# Patient Record
Sex: MALE | Race: WHITE
[De-identification: names, ages, dates, MRNs, and addresses within clinical notes are randomized per-mention and may not be internally consistent; named-entity substitution may affect disease eponyms.]

---

## 2020-06-24 ENCOUNTER — HOSPITAL ENCOUNTER (INPATIENT)
Dept: HOSPITAL 62 - ER | Age: 65
LOS: 1 days | Discharge: HOME | DRG: 309 | End: 2020-06-25
Attending: INTERNAL MEDICINE | Admitting: INTERNAL MEDICINE
Payer: COMMERCIAL

## 2020-06-24 DIAGNOSIS — E78.00: ICD-10-CM

## 2020-06-24 DIAGNOSIS — I48.11: Primary | ICD-10-CM

## 2020-06-24 DIAGNOSIS — K44.9: ICD-10-CM

## 2020-06-24 DIAGNOSIS — E78.5: ICD-10-CM

## 2020-06-24 DIAGNOSIS — Z88.2: ICD-10-CM

## 2020-06-24 DIAGNOSIS — K80.00: ICD-10-CM

## 2020-06-24 DIAGNOSIS — I10: ICD-10-CM

## 2020-06-24 DIAGNOSIS — Z79.82: ICD-10-CM

## 2020-06-24 DIAGNOSIS — Z79.899: ICD-10-CM

## 2020-06-24 LAB
ADD MANUAL DIFF: NO
ALBUMIN SERPL-MCNC: 4.6 G/DL (ref 3.5–5)
ALP SERPL-CCNC: 69 U/L (ref 38–126)
ANION GAP SERPL CALC-SCNC: 8 MMOL/L (ref 5–19)
APPEARANCE UR: CLEAR
APTT PPP: YELLOW S
AST SERPL-CCNC: 31 U/L (ref 17–59)
BASOPHILS # BLD AUTO: 0.1 10^3/UL (ref 0–0.2)
BASOPHILS NFR BLD AUTO: 0.6 % (ref 0–2)
BILIRUB DIRECT SERPL-MCNC: 0 MG/DL (ref 0–0.4)
BILIRUB SERPL-MCNC: 1.5 MG/DL (ref 0.2–1.3)
BILIRUB UR QL STRIP: NEGATIVE
BUN SERPL-MCNC: 16 MG/DL (ref 7–20)
CALCIUM: 9.3 MG/DL (ref 8.4–10.2)
CHLORIDE SERPL-SCNC: 104 MMOL/L (ref 98–107)
CO2 SERPL-SCNC: 27 MMOL/L (ref 22–30)
EOSINOPHIL # BLD AUTO: 0.1 10^3/UL (ref 0–0.6)
EOSINOPHIL NFR BLD AUTO: 0.9 % (ref 0–6)
ERYTHROCYTE [DISTWIDTH] IN BLOOD BY AUTOMATED COUNT: 13.6 % (ref 11.5–14)
GLUCOSE SERPL-MCNC: 117 MG/DL (ref 75–110)
GLUCOSE UR STRIP-MCNC: NEGATIVE MG/DL
HCT VFR BLD CALC: 46.5 % (ref 37.9–51)
HGB BLD-MCNC: 16.1 G/DL (ref 13.5–17)
KETONES UR STRIP-MCNC: NEGATIVE MG/DL
LYMPHOCYTES # BLD AUTO: 1.4 10^3/UL (ref 0.5–4.7)
LYMPHOCYTES NFR BLD AUTO: 10.9 % (ref 13–45)
MCH RBC QN AUTO: 31.7 PG (ref 27–33.4)
MCHC RBC AUTO-ENTMCNC: 34.6 G/DL (ref 32–36)
MCV RBC AUTO: 92 FL (ref 80–97)
MONOCYTES # BLD AUTO: 0.9 10^3/UL (ref 0.1–1.4)
MONOCYTES NFR BLD AUTO: 7 % (ref 3–13)
NEUTROPHILS # BLD AUTO: 10.2 10^3/UL (ref 1.7–8.2)
NEUTS SEG NFR BLD AUTO: 80.6 % (ref 42–78)
NITRITE UR QL STRIP: NEGATIVE
PH UR STRIP: 7 [PH] (ref 5–9)
PLATELET # BLD: 205 10^3/UL (ref 150–450)
POTASSIUM SERPL-SCNC: 4 MMOL/L (ref 3.6–5)
PROT SERPL-MCNC: 7.5 G/DL (ref 6.3–8.2)
PROT UR STRIP-MCNC: NEGATIVE MG/DL
RBC # BLD AUTO: 5.07 10^6/UL (ref 4.35–5.55)
SP GR UR STRIP: 1.01
TOTAL CELLS COUNTED % (AUTO): 100 %
UROBILINOGEN UR-MCNC: NEGATIVE MG/DL (ref ?–2)
WBC # BLD AUTO: 12.7 10^3/UL (ref 4–10.5)

## 2020-06-24 PROCEDURE — 71260 CT THORAX DX C+: CPT

## 2020-06-24 PROCEDURE — 96376 TX/PRO/DX INJ SAME DRUG ADON: CPT

## 2020-06-24 PROCEDURE — 82550 ASSAY OF CK (CPK): CPT

## 2020-06-24 PROCEDURE — 83690 ASSAY OF LIPASE: CPT

## 2020-06-24 PROCEDURE — 93010 ELECTROCARDIOGRAM REPORT: CPT

## 2020-06-24 PROCEDURE — 96366 THER/PROPH/DIAG IV INF ADDON: CPT

## 2020-06-24 PROCEDURE — 93005 ELECTROCARDIOGRAM TRACING: CPT

## 2020-06-24 PROCEDURE — 99285 EMERGENCY DEPT VISIT HI MDM: CPT

## 2020-06-24 PROCEDURE — 36415 COLL VENOUS BLD VENIPUNCTURE: CPT

## 2020-06-24 PROCEDURE — 71045 X-RAY EXAM CHEST 1 VIEW: CPT

## 2020-06-24 PROCEDURE — 96365 THER/PROPH/DIAG IV INF INIT: CPT

## 2020-06-24 PROCEDURE — 96361 HYDRATE IV INFUSION ADD-ON: CPT

## 2020-06-24 PROCEDURE — 80053 COMPREHEN METABOLIC PANEL: CPT

## 2020-06-24 PROCEDURE — 81001 URINALYSIS AUTO W/SCOPE: CPT

## 2020-06-24 PROCEDURE — 80048 BASIC METABOLIC PNL TOTAL CA: CPT

## 2020-06-24 PROCEDURE — 85025 COMPLETE CBC W/AUTO DIFF WBC: CPT

## 2020-06-24 PROCEDURE — 85027 COMPLETE CBC AUTOMATED: CPT

## 2020-06-24 PROCEDURE — 84484 ASSAY OF TROPONIN QUANT: CPT

## 2020-06-24 PROCEDURE — 84443 ASSAY THYROID STIM HORMONE: CPT

## 2020-06-24 PROCEDURE — 96375 TX/PRO/DX INJ NEW DRUG ADDON: CPT

## 2020-06-24 PROCEDURE — 76705 ECHO EXAM OF ABDOMEN: CPT

## 2020-06-24 RX ADMIN — SOTALOL HYDROCHLORIDE SCH MG: 80 TABLET ORAL at 19:44

## 2020-06-24 RX ADMIN — ENOXAPARIN SODIUM SCH MG: 80 INJECTION SUBCUTANEOUS at 22:12

## 2020-06-24 RX ADMIN — FAMOTIDINE SCH MG: 20 TABLET, FILM COATED ORAL at 22:12

## 2020-06-24 NOTE — RADIOLOGY REPORT (SQ)
EXAM DESCRIPTION:  U/S ABDOMEN LIMITED W/O DOP



IMAGES COMPLETED DATE/TIME:  6/24/2020 11:46 am



REASON FOR STUDY:  RUQ abd/rib pain



COMPARISON:  None.



TECHNIQUE:  Dynamic and static grayscale images acquired of the abdomen and recorded on PACS. Additio
nal selected color Doppler and spectral images recorded.



LIMITATIONS:  None.



FINDINGS:  PANCREAS: No masses.  Visualized pancreatic duct normal caliber.

LIVER: Normal size Echo texture normal. No focal masses.

LIVER VASCULATURE: Normal directional flow of the main portal vein and hepatic veins.

GALLBLADDER: No stones. Normal wall thickness. No pericholecystic fluid.

ULTRASOUND-DETECTED EL'S SIGN: Negative.

INTRAHEPATIC DUCTS AND COMMON DUCT: CBD and intrahepatic ducts normal caliber. No filling defects.

INFERIOR VENA CAVA: Normal flow.

AORTA: No aneurysm.

RIGHT KIDNEY:  Normal size.   Normal echogenicity.   No solid or suspicious masses.   No hydronephros
is.   No calcifications.

PERITONEAL AND RIGHT PLEURAL SPACE: No ascites or effusions.

OTHER: No other significant findings.



IMPRESSION:  NORMAL RIGHT UPPER QUADRANT ULTRASOUND AS VISUALIZED.



TECHNICAL DOCUMENTATION:  JOB ID:  [id number]

 2011 [employer]- All Rights Reserved



Reading location - IP/workstation name: [email or web address]

## 2020-06-24 NOTE — EKG REPORT
SEVERITY:- ABNORMAL ECG -

ATRIAL FIBRILLATION

CONSIDER POSTERIOR INFARCT

BORDERLINE ST DEPRESSION, ANTERIOR LEADS

:

Confirmed by: Lorena Bright 24-Jun-2020 13:19:09

## 2020-06-24 NOTE — PDOC H&P
History of Present Illness


Admission Date/PCP: 


  06/24/20 14:07





  MAMIE DURAND MD





Patient complains of: RUQ pain


History of Present Illness: 


KEVIN MULLIGAN is a 65 year old male with a past medical history significant for

atrial fibrillation (not anticoagulated), hypertension, hyperlipidemia who 

presented to the emergency department today with complaint of sudden onset right

upper quadrant pain described as sharp and stabbing this morning that woke him 

from sleep.  Reports associated nausea but without emesis.


Evaluation emergency department revealed Atrial fibrillation RVR (), 

leukocytosis (12.7), unremarkable chemistry, normal troponins x2, normal lipase,

negative urinalysis.


Chest x-ray was benign.


Abdominal ultrasound benign.


CT chest demonstrated cholelithiasis with pericholecystic fluid and a dilated 

common bile duct consistent with cholecystitis and central obesity to the right 

upper lobe representing a developing airspace process.





Patient was provided IV fluids, diltiazem push, started on diltiazem drip, and 

referred to the hospital service for admission and management of the above-

stated complaints findings.


Discussion with surgery, Dr. Del Rio, conservative management at this time with

likely outpatient/elective cholecystectomy.


Patient has requested alternative cardiology consultation; we will see if Dr. Munguia is available to me the patient.





Past Medical History


Cardiac Medical History: Reports: Atrial Fibrillation, Hyperlipidema, 

Hypertension


Pulmonary Medical History: Reports: None


EENT Medical History: Reports: None


Neurological Medical History: Reports: None


Endocrine Medical History: Reports: None


Renal/ Medical History: Reports: None


GI Medical History: Reports: Hiatal Hernia


Skin Medical History: Reports: None


Psychiatric Medical History: Reports: None


Traumatic Medical History: Reports: None


Hematology: Reports: None


Infectious Medical History: Reports: None





Past Surgical History


Past Surgical History: 


   Denies: Pacemaker





Social History


Information Source: Patient


Lives with: Family


Smoking Status: Never Smoker


Electronic Cigarette use?: No


Frequency of Alcohol Use: None


Hx Recreational Drug Use: No


Hx Prescription Drug Abuse: No





- Advance Directive


Resuscitation Status: Full Code


Surrogate healthcare decision maker:: 


The patient's wife, Rachel Mulligan.





Family History


Family History: Reviewed & Not Pertinent


Parental Family History Reviewed: Yes


Children Family History Reviewed: Yes


Sibling(s) Family History Reviewed.: Yes





Medication/Allergy


Home Medications: 








Atorvastatin Calcium [Lipitor 40 mg Tablet] 80 mg PO QHS 12/09/12 


Amlodipine Besylate/Benazepril [Amlodipine-Benazepril 5-20 mg] 2 tab PO DAILY 

06/24/20 


Aspirin [Adult Low Dose Aspirin EC] 81 mg PO DAILY 06/24/20 


Loratadine [Claritin 10 mg Tablet] 10 mg PO DAILY 06/24/20 


Sotalol HCl [Betapace 80 mg Tablet] 80 mg PO Q12 06/24/20 








Allergies/Adverse Reactions: 


                                        





Sulfa (Sulfonamide Antibiotics) Allergy (Verified 04/30/15 19:16)


   











Review of Systems


Constitutional: ABSENT: chills, fever(s), headache(s), weight gain, weight loss


Eyes: ABSENT: visual disturbances


Ears: ABSENT: hearing changes


Cardiovascular: PRESENT: palpitations.  ABSENT: chest pain, dyspnea on exertion,

edema, orthropnea


Respiratory: ABSENT: cough, dyspnea, hemoptysis, sputum


Gastrointestinal: PRESENT: abdominal pain, nausea.  ABSENT: constipation, 

diarrhea, hematemesis, hematochezia, vomiting


Genitourinary: ABSENT: dysuria, hematuria


Musculoskeletal: ABSENT: joint swelling


Integumentary: ABSENT: rash, wounds


Neurological: ABSENT: abnormal gait, abnormal speech, confusion, dizziness, 

focal weakness, syncope


Psychiatric: ABSENT: anxiety, depression, homidical ideation, suicidal ideation


Endocrine: ABSENT: cold intolerance, heat intolerance, polydipsia, polyuria


Hematologic/Lymphatic: ABSENT: easy bleeding, easy bruising





Physical Exam


Vital Signs: 


                                        











Temp Pulse Resp BP Pulse Ox


 


 98.9 F   84   42 H  116/81   96 


 


 06/24/20 07:58  06/24/20 07:58  06/24/20 14:00  06/24/20 13:55  06/24/20 14:00








                                 Intake & Output











 06/23/20 06/24/20 06/25/20





 06:59 06:59 06:59


 


Intake Total   1001


 


Balance   1001


 


Weight   68.7 kg











General appearance: PRESENT: no acute distress, cooperative, well-developed, 

well-nourished


Head exam: PRESENT: atraumatic, normocephalic


Eye exam: PRESENT: conjunctiva pink, EOMI, PERRLA.  ABSENT: scleral icterus


Mouth exam: PRESENT: moist, tongue midline


Neck exam: ABSENT: carotid bruit, JVD, lymphadenopathy, thyromegaly


Respiratory exam: PRESENT: clear to auscultation ricarda, symmetrical, unlabored.  

ABSENT: rales, rhonchi, wheezes


Cardiovascular exam: PRESENT: irregular rhythm, +S1, +S2, tachycardia.  ABSENT: 

diastolic murmur, rubs, systolic murmur


Pulses: PRESENT: normal dorsalis pedis pul


Vascular exam: PRESENT: normal capillary refill


GI/Abdominal exam: PRESENT: normal bowel sounds, soft.  ABSENT: distended, 

guarding, mass, organolmegaly, rebound, tenderness


Rectal exam: PRESENT: deferred


Extremities exam: PRESENT: full ROM.  ABSENT: calf tenderness, clubbing, pedal 

edema


Neurological exam: PRESENT: alert, awake, oriented to person, oriented to place,

oriented to time, oriented to situation, CN II-XII grossly intact.  ABSENT: 

motor sensory deficit


Psychiatric exam: PRESENT: anxious, appropriate affect, normal mood.  ABSENT: 

homicidal ideation, suicidal ideation


Skin exam: PRESENT: dry, intact, warm.  ABSENT: cyanosis, rash





Results


Laboratory Results: 


                                        





                                 06/24/20 08:50 





                                 06/24/20 08:50 





                                        











  06/24/20 06/24/20 06/24/20





  08:50 08:50 08:50


 


WBC  12.7 H  


 


RBC  5.07  


 


Hgb  16.1  


 


Hct  46.5  


 


MCV  92  


 


MCH  31.7  


 


MCHC  34.6  


 


RDW  13.6  


 


Plt Count  205  


 


Seg Neutrophils %  80.6 H  


 


Sodium   139.3 


 


Potassium   4.0 


 


Chloride   104 


 


Carbon Dioxide   27 


 


Anion Gap   8 


 


BUN   16 


 


Creatinine   0.87 


 


Est GFR ( Amer)   > 60 


 


Glucose   117 H 


 


Calcium   9.3 


 


Total Bilirubin   1.5 H 


 


AST   31 


 


Alkaline Phosphatase   69 


 


Total Protein   7.5 


 


Albumin   4.6 


 


Lipase   232.9 


 


Urine Color    YELLOW


 


Urine Appearance    CLEAR


 


Urine pH    7.0


 


Ur Specific Gravity    1.012


 


Urine Protein    NEGATIVE


 


Urine Glucose (UA)    NEGATIVE


 


Urine Ketones    NEGATIVE


 


Urine Blood    NEGATIVE


 


Urine Nitrite    NEGATIVE


 


Ur Leukocyte Esterase    NEGATIVE


 


Urine WBC (Auto)    2


 


Urine RBC (Auto)    0








                                        











  06/24/20 06/24/20 06/24/20





  08:50 08:50 12:35


 


Creatine Kinase  158  


 


Troponin I   < 0.012  < 0.012











Impressions: 


                                        





Chest X-Ray  06/24/20 09:13


IMPRESSION:  1.  No acute pulmonary findings.


2.  Question of a left paravertebral mass, lower thoracic spine.  Correlation 

suggested in further evaluation with the routine CT chest.


 








Abdomen Ultrasound  06/24/20 10:13


IMPRESSION:  NORMAL RIGHT UPPER QUADRANT ULTRASOUND AS VISUALIZED.


 








Chest CT  06/24/20 11:57


IMPRESSION:  Cholelithiasis with pericholecystic fluid and a dilated common bile

duct, consistent with cholecystitis.


Subtle tree-in-bud opacities involving the right upper lobe may represent a 

developing airspace process.


Clinically queried paraspinal masses revealed to be on the basis of a small 

hiatal hernia.


 














Assessment and Plan





- Diagnosis


(1) Atrial fibrillation with RVR


Is this a current diagnosis for this admission?: Yes   


Plan: 


Patient is admitted to [facility] on continuous cardiac telemetry.


Check TSH


He is currently on a diltiazem drip for rate control.


We will start full dose of Lovenox pending formal cardiology evaluation and disc

ussion.


[id number] Score: 2.  Do not anticipate patient will be discharged on long-

term anticoagulant therapy.


Hold aspirin therapy while on Lovenox.


Cardiology consultation with Dr. Munguia.  Patient was previously seen by Dr. Bright but has requested alternate provider.


Cardiac diet.








(2) Hypertension


Is this a current diagnosis for this admission?: Yes   


Plan: 


Patient's home medications are on hold.


Currently on diltiazem drip.


Cardiac diet.








(3) Hyperlipidemia


Is this a current diagnosis for this admission?: Yes   


Plan: 


Continue home dose atorvastatin.


Cardiac diet.








(4) Cholelithiasis and cholecystitis without obstruction


Qualifiers: 


   Cholelithiasis location: gallbladder   Cholecystitis acuity: acute   

Qualified Code(s): K80.00 - Calculus of gallbladder with acute cholecystitis 

without obstruction   


Is this a current diagnosis for this admission?: Yes   


Plan: 


Noted on CT imaging with dilated common bile duct


Right upper quadrant ultrasound was benign.


WBC is elevated to 12.


Patient does report sudden onset of right upper quadrant pain; possible he 

passed a stone overnight which resulted in his discomfort.





Discussed with Dr. Del Rio; does not anticipate imminent surgical needs.  

Patient is appropriate for outpatient/elective cholecystectomy.








(5) Abnormal chest CT


Is this a current diagnosis for this admission?: Yes   


Plan: 


Small right upper lobe airspace disease noted on CT imaging.


Patient denies all symptoms; specifically denies dyspnea, orthopnea, cough, 

sputum production, fatigue, fever.


No known community exposure to COVID-19.





We will monitor for symptomatologies to suggest development of infectious 

process.


No indications for antibiotics at this time.








- Time


Time Spent with patient: 35 or more minutes


Medications reviewed and adjusted accordingly: Yes


Anticipated discharge: Home





- Inpatient Certification


Based on my medical assessment, after consideration of the patient's 

comorbidities, presenting symptoms, or acuity I expect that the services needed 

warrant INPATIENT care.: Yes


I certify that my determination is in accordance with my understanding of 

Medicare's requirements for reasonable and necessary INPATIENT services [42 CFR 

412.3e].: Yes


Medical Necessity: Need For Continuous Telemetry Monitoring, Risk of Complicati

on if Not Cared For in Hospital, Risk of Diagnosis Which Will Require Inpatient 

Eval/Care/Monitoring

## 2020-06-24 NOTE — ER DOCUMENT REPORT
Entered by STAN VALDEZ SCRIBE  06/24/20 0921 





Acting as scribe for:TIFFANIE MTZ MD





ED GI/





- General


Chief Complaint: Abdominal Pain


Stated Complaint: ABDOMINAL PAIN


Time Seen by Provider: 06/24/20 09:02


Primary Care Provider: 


MAMIE DURAND MD [Primary Care Provider] - Follow up as needed


Mode of Arrival: Ambulatory


Information source: Patient


Notes: 





This 65 year old male patient presents to the emergency department today with 

complaints of right upper quadrant abdominal pain going into the right chest. 

Patient reports that he was awoken from sleep this morning at around 4:00 AM 

with this RUQ/chest sharp stabbing abdominal pain. Patient complains of nausea 

but has not vomited. Patient denies any recent trauma or heavy lifting/straining

that he thinks could have caused this. 





TRAVEL OUTSIDE OF THE [place] IN LAST 30 DAYS: No





- Related Data


Allergies/Adverse Reactions: 


                                        





Sulfa (Sulfonamide Antibiotics) Allergy (Verified 04/30/15 19:16)


   








Home Medications: amilodipine sotalol.  gas-x





Past Medical History





- General


Information source: Patient





- Social History


Smoking Status: Never Smoker


Cigarette use (# per day): No


Chew tobacco use (# tins/day): No


Smoking Education Provided: No


Frequency of alcohol use: None


Drug Abuse: None


Occupation: retired


Family History: Reviewed & Not Pertinent


Patient has homicidal ideation: No





- Past Medical History


Cardiac Medical History: Reports: Hx Atrial Fibrillation, Hx 

Hypercholesterolemia, Hx Hypertension


Surgical Hx: Negative





- Immunizations


Hx Diphtheria, Pertussis, Tetanus Vaccination: Yes


Hx Pneumococcal Vaccination: 01/01/06





Review of Systems





- Review of Systems


Constitutional: No symptoms reported


EENT: No symptoms reported


Cardiovascular: No symptoms reported


Respiratory: No symptoms reported


Gastrointestinal: See HPI, Abdominal pain, Nausea.  denies: Vomiting


Genitourinary: No symptoms reported


Male Genitourinary: No symptoms reported


Musculoskeletal: No symptoms reported


Skin: No symptoms reported


Hematologic/Lymphatic: No symptoms reported


Neurological/Psychological: No symptoms reported


-: Yes All other systems reviewed and negative





Physical Exam





- Vital signs


Vitals: 


                                        











Temp Pulse Resp BP Pulse Ox


 


 98.9 F   84   16   133/93 H  98 


 


 06/24/20 07:58  06/24/20 07:58  06/24/20 07:58  06/24/20 07:58  06/24/20 07:58














- Notes


Notes: 





Physical Exam:


 


General: Alert, appears well. 


 


HEENT: Normocephalic. Atraumatic. PERRL. Extraocular movements intact. 

Oropharynx clear.


 


Neck: Supple. Non-tender.


 


Respiratory: No respiratory distress. Clear and equal breath sounds bilaterally.

Tenderness with palpation over the right anterior lateral inferior ribs.


 


Cardiovascular: Regular rate and rhythm. 


 


Abdominal: Normal Inspection. Non-tender. No distension. Normal Bowel Sounds. 


 


Back: No gross abnormalities. 


 


Extremities: Moves all four extremities.


Upper extremities: Normal inspection. Normal ROM.  


Lower extremities: Normal inspection. No edema. Normal ROM.


 


Neurological: Normal cognition. AAOx4. Normal speech.  


 


Psychological: Normal affect. Normal Mood. 


 


Skin: Warm. Dry. Normal color.





Course





- Re-evaluation


Re-evalutation: 





06/24/20 11:57


Pain was mostly relieved with the Toradol.  Chest x-ray shows a question of a 

left paravertebral mass in the lower thoracic spine.  Chest CT will be done to 

delineate this.


06/24/20 13:15


The patient's heart rate increased into the 130-140 range.  Cardizem drip was 

started, but did not slow the rate.  Patient was given a Cardizem 10 mg bolus 

and the rate did slow down to 92.  At this time the patient states that his 

chest pain is still good since receiving the Toradol.





06/24/20 13:49


The patient was inadvertently given a dose of Plavix 300 mg, and Lovenox 68 mg 

that had been meant to be ordered for another patient.  This was discussed with 

the patient and with the admitting provider.





- Vital Signs


Vital signs: 


                                        











Temp Pulse Resp BP Pulse Ox


 


 98.9 F   84   18   126/81 H  96 


 


 06/24/20 07:58  06/24/20 07:58  06/24/20 13:10  06/24/20 13:10  06/24/20 13:10














- Laboratory


Result Diagrams: 


                                 06/24/20 08:50





                                 06/24/20 08:50


Laboratory results interpreted by me: 


                                        











  06/24/20 06/24/20





  08:50 08:50


 


WBC  12.7 H 


 


Lymph % (Auto)  10.9 L 


 


Absolute Neuts (auto)  10.2 H 


 


Seg Neutrophils %  80.6 H 


 


Glucose   117 H


 


Total Bilirubin   1.5 H














- Diagnostic Test


Radiology reviewed: Image reviewed, Reports reviewed - RUQ abdominal ultrasound 

was read as normal.  Chest x-ray shows a question of a left paravertebral mass 

in the lower thoracic spine.  CT correlation recommended. Contrasted CT scan of 

the chest shows the para spinal mass to be a hiatal hernia, however there is cho

lelithiasis with pericholecystic fluid and dilated common bile duct consistent 

with cholecystitis.  There is subtle tree-in-bud opacities involving the right 

upper lobe which may represent a developing airspace process.





- EKG Interpretation by Me


EKG shows normal: Axis, Intervals, QRS Complexes.  abnormal: ST-T Waves - 

Borderline ST depression anterior leads.


Rate: Tachycardia - 122





- Consults


  ** Jeanine Carlos NP


Time consulted: 13:30


Consulted provider: will come to ER





Discharge





- Discharge


Clinical Impression: 


 Atrial fibrillation with RVR, Chest wall pain





Cholelithiasis and cholecystitis without obstruction


Qualifiers:


 Cholelithiasis location: gallbladder Cholecystitis acuity: acute Qualified 

Code(s): K80.00 - Calculus of gallbladder with acute cholecystitis without 

obstruction





Leukocytosis


Qualifiers:


 Leukocytosis type: unspecified Qualified Code(s): D72.829 - Elevated white 

blood cell count, unspecified





Right upper lobe pneumonia


Qualifiers:


 Pneumonia type: due to unspecified organism Qualified Code(s): J18.9 - Pn

eumonia, unspecified organism





Condition: Stable


Disposition: ADMITTED AS INPATIENT


Admitting Provider: Brooke (Hospitalist) - Jeanine Carlos NP will be writing 

orders.


Unit Admitted: [facility]


Referrals: 


MAMIE DURAND MD [Primary Care Provider] - Follow up as needed





I personally performed the services described in the documentation, reviewed and

edited the documentation which was dictated to the scribe in my presence, and it

accurately records my words and actions.

## 2020-06-24 NOTE — RADIOLOGY REPORT (SQ)
EXAM DESCRIPTION:  CT CHEST WITH



IMAGES COMPLETED DATE/TIME:  6/24/2020 12:16 pm



REASON FOR STUDY:  Left paravertebral thoracic mass



COMPARISON:  None.



TECHNIQUE:  CT scan of the chest performed using helical scanning technique with dynamic intravenous 
contrast injection.  Images reviewed with lung, soft tissue and bone windows.  Reconstructed coronal 
and sagittal MPR and MIP images reviewed.  All images stored on PACS.

All CT scanners at this facility use dose modulation, iterative reconstruction, and/or weight based d
osing when appropriate to reduce radiation dose to as low as reasonably achievable (ALARA).

[facility]: Dose Right  [facility]: CareDose    [facility]: Dose Right    [facility]: Teradose 4D    [facility]: Smart Technologies



CONTRAST TYPE AND DOSE:  80 mL Omnipaque 350- low osmolar.



RENAL FUNCTION:  BUN 16; creatinine 0.87



RADIATION DOSE:  CT Rad equipment meets quality standard of care and radiation dose reduction techniq
ues were employed. CTDIvol: 7.6 mGy. DLP: 330 mGy-cm. .



LIMITATIONS:  None.



FINDINGS:  LUNGS AND PLEURA: Few subtle tree-in-bud opacities are seen within the right upper lobe.  
No focal consolidation.  No pleural effusion.  No pneumothorax.

HILAR AND MEDIASTINAL STRUCTURES: No identified masses or abnormal nodes.  Incidental note is made of
 a small hiatal hernia.

HEART AND VASCULAR STRUCTURES: No aneurysm or dissection.  No central pulmonary emboli.  No pericardi
al effusion.

HARDWARE: None in the chest.

UPPER ABDOMEN: The gallbladder appears hydropic, demonstrating pericholecystic fluid.  A single nonob
structing stone is seen within the gallbladder neck.  The common bile duct is dilated on the order of
 11 mm.  This tapers normal without obstructing lesion identified.  The pancreatic duct demonstrates 
normal caliber.

THYROID AND OTHER SOFT TISSUES: No masses.  No adenopathy.

BONES: No significant finding.

OTHER: No other significant finding.



IMPRESSION:  Cholelithiasis with pericholecystic fluid and a dilated common bile duct, consistent wit
h cholecystitis.

Subtle tree-in-bud opacities involving the right upper lobe may represent a developing airspace proce
ss.

Clinically queried paraspinal masses revealed to be on the basis of a small hiatal hernia.



TECHNICAL DOCUMENTATION:  JOB ID:  [id number]

Quality ID # [id number]: Final reports with documentation of one or more dose reduction techniques (e.g., Au
tomated exposure control, adjustment of the mA and/or kV according to patient size, use of iterative 
reconstruction technique)

 2011 [employer] [facility]- All Rights Reserved



Reading location - IP/workstation name: [email or web address]

## 2020-06-24 NOTE — RADIOLOGY REPORT (SQ)
EXAM DESCRIPTION:  CHEST SINGLE VIEW



IMAGES COMPLETED DATE/TIME:  6/24/2020 9:37 am



REASON FOR STUDY:  Right anterior inferior rib pain



COMPARISON:  4/30/2015



EXAM PARAMETERS:  NUMBER OF VIEWS: One view.

TECHNIQUE: Single frontal radiographic view of the chest acquired.

RADIATION DOSE: NA

LIMITATIONS: None.



FINDINGS:  LUNGS AND PLEURA:  No acute pulmonary consolidation.  Question of a left paravertebral mas
s, lower thoracic spine.  No pneumothorax or pleural effusion.

MEDIASTINUM AND HILAR STRUCTURES: No masses.  Contour normal.

HEART AND VASCULAR STRUCTURES: Heart normal in size.  Normal vasculature.

BONES: No acute findings.

HARDWARE: None in the chest.

OTHER: No other significant finding.



IMPRESSION:  1.  No acute pulmonary findings.

2.  Question of a left paravertebral mass, lower thoracic spine.  Correlation suggested in further ev
aluation with the routine CT chest.



TECHNICAL DOCUMENTATION:  JOB ID:  [id number]

 2011 [employer]- All Rights Reserved



Reading location - IP/workstation name: [email or web address]

## 2020-06-25 VITALS — DIASTOLIC BLOOD PRESSURE: 78 MMHG | SYSTOLIC BLOOD PRESSURE: 122 MMHG

## 2020-06-25 LAB
ANION GAP SERPL CALC-SCNC: 5 MMOL/L (ref 5–19)
BUN SERPL-MCNC: 18 MG/DL (ref 7–20)
CALCIUM: 8.6 MG/DL (ref 8.4–10.2)
CHLORIDE SERPL-SCNC: 107 MMOL/L (ref 98–107)
CO2 SERPL-SCNC: 26 MMOL/L (ref 22–30)
ERYTHROCYTE [DISTWIDTH] IN BLOOD BY AUTOMATED COUNT: 13.7 % (ref 11.5–14)
GLUCOSE SERPL-MCNC: 105 MG/DL (ref 75–110)
HCT VFR BLD CALC: 43.9 % (ref 37.9–51)
HGB BLD-MCNC: 15.2 G/DL (ref 13.5–17)
MCH RBC QN AUTO: 31.6 PG (ref 27–33.4)
MCHC RBC AUTO-ENTMCNC: 34.6 G/DL (ref 32–36)
MCV RBC AUTO: 91 FL (ref 80–97)
PLATELET # BLD: 182 10^3/UL (ref 150–450)
POTASSIUM SERPL-SCNC: 3.8 MMOL/L (ref 3.6–5)
RBC # BLD AUTO: 4.8 10^6/UL (ref 4.35–5.55)
WBC # BLD AUTO: 10.7 10^3/UL (ref 4–10.5)

## 2020-06-25 RX ADMIN — SOTALOL HYDROCHLORIDE SCH MG: 80 TABLET ORAL at 09:16

## 2020-06-25 RX ADMIN — ENOXAPARIN SODIUM SCH: 80 INJECTION SUBCUTANEOUS at 09:16

## 2020-06-25 RX ADMIN — FAMOTIDINE SCH MG: 20 TABLET, FILM COATED ORAL at 09:16

## 2020-06-25 NOTE — PDOC DISCHARGE SUMMARY
Impression





- Admit/DC Date/PCP


Admission Date/Primary Care Provider: 


  06/24/20 14:07





  MAMIE DURAND MD





Discharge Date: 06/25/20





- Discharge Diagnosis


(1) Atrial fibrillation with RVR


Is this a current diagnosis for this admission?: Yes   





(2) Hypertension


Is this a current diagnosis for this admission?: Yes   





(3) Cholelithiasis and cholecystitis without obstruction


Is this a current diagnosis for this admission?: Yes   





(4) Hyperlipidemia


Is this a current diagnosis for this admission?: Yes   





(5) Hiatal hernia


Is this a current diagnosis for this admission?: Yes   





- Additional Information


Resuscitation Status: Full Code


Discharge Diet: Cardiac


Discharge Activity: Activity As Tolerated


Referrals: 


LAKEISHA VELASQUEZ MD [ACTIVE STAFF] - 07/13/20 9:30 am (Please arrive to the 

surgical clinic 15 minutes early)


MAMIE DURAND MD [Primary Care Provider] - 07/01/20 9:00 am


Prescriptions: 


Amoxicillin/Potassium Clav [Augmentin 875-125 Tablet] 1 tab PO Q12 #14 tablet


Home Medications: 








Atorvastatin Calcium [Lipitor 40 mg Tablet] 80 mg PO QHS 12/09/12 


Amlodipine Besylate/Benazepril [Amlodipine-Benazepril 5-20 mg] 2 tab PO DAILY 

06/24/20 


Aspirin [Adult Low Dose Aspirin EC] 81 mg PO DAILY 06/24/20 


Loratadine [Claritin 10 mg Tablet] 10 mg PO DAILY 06/24/20 


Sotalol HCl [Betapace 80 mg Tablet] 80 mg PO Q12 06/24/20 


Amoxicillin/Potassium Clav [Augmentin 875-125 Tablet] 1 tab PO Q12 #14 tablet 

06/25/20 


Famotidine [Pepcid 20 mg Tablet] 20 mg PO Q12  tablet 06/25/20 


Sotalol HCl [Betapace 80 mg Tablet] 80 mg PO Q12  tablet 06/25/20 











History of Present Illiness


History of Present Illness: 


KEVIN MULLIGAN is a 65 year old male with a past medical history significant for

atrial fibrillation (not anticoagulated), hypertension, hyperlipidemia who 

presented to the emergency department today with complaint of sudden onset right

upper quadrant pain described as sharp and stabbing this morning that woke him 

from sleep.  Reports associated nausea but without emesis.


Evaluation emergency department revealed Atrial fibrillation RVR (), 

leukocytosis (12.7), unremarkable chemistry, normal troponins x2, normal lipase,

negative urinalysis.


Chest x-ray was benign.


Abdominal ultrasound benign.


CT chest demonstrated cholelithiasis with pericholecystic fluid and a dilated 

common bile duct consistent with cholecystitis and central obesity to the right 

upper lobe representing a developing airspace process.





Patient was provided IV fluids, diltiazem push, started on diltiazem drip, and 

referred to the hospital service for admission and management of the above-

stated complaints findings.


Discussion with surgery, Dr. Velasquez, conservative management at this time with

likely outpatient/elective cholecystectomy.


Patient has requested alternative cardiology consultation; we will see if Dr. Munguia is available to me the patient.








Hospital Course


Hospital Course: 


The patient's hospital course was significant for an abrupt return to normal 

sinus rhythm with his sotalol.  It is most likely that the patient passed a 

gallstone.  His duct was still dilated.  The discomfort of passing a stone 

because the patient's sinus rhythm to convert back to rapid A. fib.  Back on the

sotalol he is back in sinus rhythm.  He has good rate control.  He will need 

antibiotics for the cholecystitis with a follow-up for likely laparoscopic 

cholecystectomy in several weeks.  In addition because his atrial fibrillation 

converted spontaneously cardiology will not have to see him as an inpatient but 

rather follow-up as an outpatient.





Physical Exam


Vital Signs: 


                                        











Temp Pulse Resp BP Pulse Ox


 


 98.0 F   68   16   122/78   98 


 


 06/25/20 08:18  06/25/20 08:18  06/25/20 08:18  06/25/20 08:18  06/25/20 08:18








                                 Intake & Output











 06/24/20 06/25/20 06/26/20





 06:59 06:59 06:59


 


Intake Total  1422 


 


Balance  1422 


 


Weight  66.8 kg 











General appearance: PRESENT: no acute distress, cooperative


Respiratory exam: PRESENT: clear to auscultation ricarda, symmetrical, unlabored.  

ABSENT: rales, rhonchi, tachypnea, wheezes


Cardiovascular exam: PRESENT: RRR, +S1, +S2


GI/Abdominal exam: PRESENT: normal bowel sounds, soft.  ABSENT: distended, 

tenderness


Neurological exam: PRESENT: alert, awake, oriented to person, oriented to place,

oriented to time, oriented to situation, CN II-XII grossly intact.  ABSENT: 

altered, motor sensory deficit


Psychiatric exam: PRESENT: appropriate affect, normal mood.  ABSENT: agitated, 

anxious


Focused psych exam: ABSENT: delusional, paranoid, restlessness





Results


Laboratory Results: 


                                        











WBC  10.7 10^3/uL (4.0-10.5)  H  06/25/20  05:33    


 


RBC  4.80 10^6/uL (4.35-5.55)   06/25/20  05:33    


 


Hgb  15.2 g/dL (13.5-17.0)   06/25/20  05:33    


 


Hct  43.9 % (37.9-51.0)   06/25/20  05:33    


 


MCV  91 fl (80-97)   06/25/20  05:33    


 


MCH  31.6 pg (27.0-33.4)   06/25/20  05:33    


 


MCHC  34.6 g/dL (32.0-36.0)   06/25/20  05:33    


 


RDW  13.7 % (11.5-14.0)   06/25/20  05:33    


 


Plt Count  182 10^3/uL (150-450)   06/25/20  05:33    


 


Lymph % (Auto)  10.9 % (13-45)  L  06/24/20  08:50    


 


Mono % (Auto)  7.0 % (3-13)   06/24/20  08:50    


 


Eos % (Auto)  0.9 % (0-6)   06/24/20  08:50    


 


Baso % (Auto)  0.6 % (0-2)   06/24/20  08:50    


 


Absolute Neuts (auto)  10.2 10^3/uL (1.7-8.2)  H  06/24/20  08:50    


 


Absolute Lymphs (auto)  1.4 10^3/uL (0.5-4.7)   06/24/20  08:50    


 


Absolute Monos (auto)  0.9 10^3/uL (0.1-1.4)   06/24/20  08:50    


 


Absolute Eos (auto)  0.1 10^3/uL (0.0-0.6)   06/24/20  08:50    


 


Absolute Basos (auto)  0.1 10^3/uL (0.0-0.2)   06/24/20  08:50    


 


Seg Neutrophils %  80.6 % (42-78)  H  06/24/20  08:50    


 


Sodium  138.2 mmol/L (137-145)   06/25/20  05:33    


 


Potassium  3.8 mmol/L (3.6-5.0)   06/25/20  05:33    


 


Chloride  107 mmol/L ()   06/25/20  05:33    


 


Carbon Dioxide  26 mmol/L (22-30)   06/25/20  05:33    


 


Anion Gap  5  (5-19)   06/25/20  05:33    


 


BUN  18 mg/dL (7-20)   06/25/20  05:33    


 


Creatinine  0.94 mg/dL (0.52-1.25)   06/25/20  05:33    


 


Est GFR ( Amer)  > 60  (>60)   06/25/20  05:33    


 


Est GFR (MDRD) Non-Af  > 60  (>60)   06/25/20  05:33    


 


Glucose  105 mg/dL ()   06/25/20  05:33    


 


Calcium  8.6 mg/dL (8.4-10.2)   06/25/20  05:33    


 


Total Bilirubin  1.5 mg/dL (0.2-1.3)  H  06/24/20  08:50    


 


Direct Bilirubin  0.0 mg/dL (0.0-0.4)   06/24/20  08:50    


 


Neonat Total Bilirubin  Not Reportable   06/24/20  08:50    


 


Neonat Direct Bilirubin  Not Reportable   06/24/20  08:50    


 


Neonat Indirect Bili  Not Reportable   06/24/20  08:50    


 


AST  31 U/L (17-59)   06/24/20  08:50    


 


ALT  21 U/L (<50)   06/24/20  08:50    


 


Alkaline Phosphatase  69 U/L ()   06/24/20  08:50    


 


Creatine Kinase  158 U/L ()   06/24/20  08:50    


 


Troponin I  < 0.012 ng/mL  06/24/20  12:35    


 


Total Protein  7.5 g/dL (6.3-8.2)   06/24/20  08:50    


 


Albumin  4.6 g/dL (3.5-5.0)   06/24/20  08:50    


 


Lipase  232.9 U/L ()   06/24/20  08:50    


 


TSH  0.73 uIU/mL (0.47-4.68)   06/24/20  08:50    


 


Urine Color  YELLOW   06/24/20  08:50    


 


Urine Appearance  CLEAR   06/24/20  08:50    


 


Urine pH  7.0  (5.0-9.0)   06/24/20  08:50    


 


Ur Specific Gravity  1.012   06/24/20  08:50    


 


Urine Protein  NEGATIVE mg/dL (NEGATIVE)   06/24/20  08:50    


 


Urine Glucose (UA)  NEGATIVE mg/dL (NEGATIVE)   06/24/20  08:50    


 


Urine Ketones  NEGATIVE mg/dL (NEGATIVE)   06/24/20  08:50    


 


Urine Blood  NEGATIVE  (NEGATIVE)   06/24/20  08:50    


 


Urine Nitrite  NEGATIVE  (NEGATIVE)   06/24/20  08:50    


 


Urine Bilirubin  NEGATIVE  (NEGATIVE)   06/24/20  08:50    


 


Urine Urobilinogen  NEGATIVE mg/dL (<2.0)   06/24/20  08:50    


 


Ur Leukocyte Esterase  NEGATIVE  (NEGATIVE)   06/24/20  08:50    


 


Urine WBC (Auto)  2 /HPF  06/24/20  08:50    


 


Urine RBC (Auto)  0 /HPF  06/24/20  08:50    


 


Urine Mucus (Auto)  RARE /LPF  06/24/20  08:50    


 


Urine Ascorbic Acid  NEGATIVE  (NEGATIVE)   06/24/20  08:50    








                                        











  06/24/20 06/24/20





  08:50 12:35


 


Troponin I  < 0.012  < 0.012











Impressions: 


                                        





Chest X-Ray  06/24/20 09:13


IMPRESSION:  1.  No acute pulmonary findings.


2.  Question of a left paravertebral mass, lower thoracic spine.  Correlation 

suggested in further evaluation with the routine CT chest.


 








Abdomen Ultrasound  06/24/20 10:13


IMPRESSION:  NORMAL RIGHT UPPER QUADRANT ULTRASOUND AS VISUALIZED.


 








Chest CT  06/24/20 11:57


IMPRESSION:  Cholelithiasis with pericholecystic fluid and a dilated common bile

duct, consistent with cholecystitis.


Subtle tree-in-bud opacities involving the right upper lobe may represent a 

developing airspace process.


Clinically queried paraspinal masses revealed to be on the basis of a small 

hiatal hernia.


 














Plan


Health Concerns: 


Patient will need laparoscopic cholecystectomy.


Plan of Treatment: 


The patient will complete a course of Augmentin.  This is not risk of QT 

prolongation with the sotalol as did other antibiotics.


Goals: 


Resolution of cholecystitis with eventual cholecystectomy and maintenance of 

sinus rhythm.


Time Spent: Greater than 30 Minutes





Stroke


Is this a Stroke Patient?: No





Acute Heart Failure





- **


Is this a Heart Failure Patient?: No

## 2020-07-24 ENCOUNTER — HOSPITAL ENCOUNTER (OUTPATIENT)
Dept: HOSPITAL 62 - RAD | Age: 65
End: 2020-07-24
Attending: SURGERY
Payer: MEDICARE

## 2020-07-24 DIAGNOSIS — K80.20: Primary | ICD-10-CM

## 2020-07-24 DIAGNOSIS — K83.8: ICD-10-CM

## 2020-07-24 PROCEDURE — 76705 ECHO EXAM OF ABDOMEN: CPT

## 2020-07-24 NOTE — RADIOLOGY REPORT (SQ)
EXAM DESCRIPTION:  U/S ABDOMEN LIMITED W/O DOP



IMAGES COMPLETED DATE/TIME:  7/24/2020 9:14 am



REASON FOR STUDY:  R10.9 UNSPECIFIED ABDOMINAL PAIN R10.9  UNSPECIFIED ABDOMINAL PAIN



COMPARISON:  None.



TECHNIQUE:  Dynamic and static grayscale images acquired of the abdomen and recorded on PACS. Additio
nal selected color Doppler and spectral images recorded.



LIMITATIONS:  None.



FINDINGS:  PANCREAS: Somewhat echogenic.  No mass.

LIVER: Increased echogenicity.  Dilatation of common hepatic duct.  Echogenic area is seen in the com
mon bile duct.

LIVER VASCULATURE: Normal directional flow of the main portal vein and hepatic veins.

GALLBLADDER: Gallstones.  The largest measures 10 mm.  No wall thickening.

ULTRASOUND-DETECTED EL'S SIGN: Negative.

INTRAHEPATIC DUCTS AND COMMON DUCT: The common bile duct measures 14 mm.  There is a filling defect i
n the common bile duct as described above.

AORTA: No aneurysm.

RIGHT KIDNEY:  Normal size, 10 cm. Normal echogenicity. No solid or suspicious masses. No hydronephro
sis. No calcifications.

PERITONEAL AND RIGHT PLEURAL SPACE: No ascites or effusions.

OTHER: No other significant findings.



IMPRESSION:  1.  Hepatic steatosis.

2.  Dilated common hepatic and common bile ducts with filling defect in the common bile duct suggesti
ng choledocholithiasis.

3.  Cholelithiasis.

4.  There appears to be some fatty infiltration of the pancreas.



TECHNICAL DOCUMENTATION:  JOB ID:  [id number]

 2011 [employer]- All Rights Reserved



Reading location - IP/workstation name: [email or web address]

## 2020-08-10 LAB
ADD MANUAL DIFF: NO
ANION GAP SERPL CALC-SCNC: 7 MMOL/L (ref 5–19)
BASOPHILS # BLD AUTO: 0.1 10^3/UL (ref 0–0.2)
BASOPHILS NFR BLD AUTO: 0.8 % (ref 0–2)
BUN SERPL-MCNC: 14 MG/DL (ref 7–20)
CALCIUM: 9.6 MG/DL (ref 8.4–10.2)
CHLORIDE SERPL-SCNC: 103 MMOL/L (ref 98–107)
CO2 SERPL-SCNC: 29 MMOL/L (ref 22–30)
EOSINOPHIL # BLD AUTO: 0.2 10^3/UL (ref 0–0.6)
EOSINOPHIL NFR BLD AUTO: 2.1 % (ref 0–6)
ERYTHROCYTE [DISTWIDTH] IN BLOOD BY AUTOMATED COUNT: 13.9 % (ref 11.5–14)
GLUCOSE SERPL-MCNC: 89 MG/DL (ref 75–110)
HCT VFR BLD CALC: 45 % (ref 37.9–51)
HGB BLD-MCNC: 15.2 G/DL (ref 13.5–17)
LYMPHOCYTES # BLD AUTO: 1.7 10^3/UL (ref 0.5–4.7)
LYMPHOCYTES NFR BLD AUTO: 22.5 % (ref 13–45)
MCH RBC QN AUTO: 31.5 PG (ref 27–33.4)
MCHC RBC AUTO-ENTMCNC: 33.9 G/DL (ref 32–36)
MCV RBC AUTO: 93 FL (ref 80–97)
MONOCYTES # BLD AUTO: 0.8 10^3/UL (ref 0.1–1.4)
MONOCYTES NFR BLD AUTO: 10.6 % (ref 3–13)
NEUTROPHILS # BLD AUTO: 4.7 10^3/UL (ref 1.7–8.2)
NEUTS SEG NFR BLD AUTO: 64 % (ref 42–78)
PLATELET # BLD: 217 10^3/UL (ref 150–450)
POTASSIUM SERPL-SCNC: 3.8 MMOL/L (ref 3.6–5)
RBC # BLD AUTO: 4.84 10^6/UL (ref 4.35–5.55)
TOTAL CELLS COUNTED % (AUTO): 100 %
WBC # BLD AUTO: 7.4 10^3/UL (ref 4–10.5)

## 2020-08-13 ENCOUNTER — HOSPITAL ENCOUNTER (OUTPATIENT)
Dept: HOSPITAL 62 - OROUT | Age: 65
Discharge: HOME | End: 2020-08-13
Attending: SURGERY
Payer: MEDICARE

## 2020-08-13 VITALS — DIASTOLIC BLOOD PRESSURE: 86 MMHG | SYSTOLIC BLOOD PRESSURE: 128 MMHG

## 2020-08-13 DIAGNOSIS — Z86.010: ICD-10-CM

## 2020-08-13 DIAGNOSIS — Z79.82: ICD-10-CM

## 2020-08-13 DIAGNOSIS — Z03.818: ICD-10-CM

## 2020-08-13 DIAGNOSIS — K80.10: Primary | ICD-10-CM

## 2020-08-13 DIAGNOSIS — G89.29: ICD-10-CM

## 2020-08-13 DIAGNOSIS — I10: ICD-10-CM

## 2020-08-13 DIAGNOSIS — Z79.899: ICD-10-CM

## 2020-08-13 DIAGNOSIS — R07.89: ICD-10-CM

## 2020-08-13 DIAGNOSIS — E78.5: ICD-10-CM

## 2020-08-13 DIAGNOSIS — I48.91: ICD-10-CM

## 2020-08-13 DIAGNOSIS — D72.829: ICD-10-CM

## 2020-08-13 PROCEDURE — C9803 HOPD COVID-19 SPEC COLLECT: HCPCS

## 2020-08-13 PROCEDURE — 88304 TISSUE EXAM BY PATHOLOGIST: CPT

## 2020-08-13 PROCEDURE — 85025 COMPLETE CBC W/AUTO DIFF WBC: CPT

## 2020-08-13 PROCEDURE — 74300 X-RAY BILE DUCTS/PANCREAS: CPT

## 2020-08-13 PROCEDURE — 87635 SARS-COV-2 COVID-19 AMP PRB: CPT

## 2020-08-13 PROCEDURE — 47563 LAPARO CHOLECYSTECTOMY/GRAPH: CPT

## 2020-08-13 PROCEDURE — 36415 COLL VENOUS BLD VENIPUNCTURE: CPT

## 2020-08-13 PROCEDURE — 80048 BASIC METABOLIC PNL TOTAL CA: CPT

## 2020-08-13 PROCEDURE — 00790 ANES IPER UPR ABD NOS: CPT

## 2020-08-13 PROCEDURE — C9290 INJ, BUPIVACAINE LIPOSOME: HCPCS

## 2020-08-13 NOTE — RADIOLOGY REPORT (SQ)
EXAM DESCRIPTION:  CHOLANGIOGRAM OPERATIVE



IMAGES COMPLETED DATE/TIME:  8/13/2020 12:07 pm



REASON FOR STUDY:  CHOLANGIOGRAM IN OR K80.10  CALCULUS OF GALLBLADDER W CHRONIC CHOLECYST W/O OBSTR



COMPARISON:  None.



FLUOROSCOPY TIME:  0.7 minutes

7 images saved to PACS.



TECHNIQUE:  Spot fluoroscopic images were obtained from an intraoperative cholangiogram.



LIMITATIONS:  None.



FINDINGS:  There is opacification of the bile ducts, cystic duct remnants and second portion of the d
uodenum without evidence of fixed filling defect or significant extravasation.



IMPRESSION:  INTRAOPERATIVE CHOLANGIOGRAM.



COMMENT:  Quality :  Final reports for procedures using fluoroscopy that document radiation exp
osure indices, or exposure time and number of fluorographic images (if radiation exposure indices are
 not available)



TECHNICAL DOCUMENTATION:  JOB ID:  [id number]

 2011 [employer]- All Rights Reserved



Reading location - IP/workstation name: [email or web address]

## 2020-08-13 NOTE — DISCHARGE SUMMARY
Discharge Summary (SDC)





- Discharge


Final Diagnosis: 





Cholecystitis


Date of Surgery: 08/13/20


Discharge Date: 08/13/20


Condition: Good


Forms:  [facility] Anesthesia D/C Instruction, Discharge POC-Surgical Service


Prescriptions: 


Hydrocodone/Acetaminophen [Norco  mg Tablet] 1 tab PO Q6HP PRN #15 tablet


 PRN Reason: 


Referrals: 


LAKEISHA VELASQUEZ MD [ACTIVE STAFF] - 08/25/20 8:15 am


Discharge Diet: As Tolerated


Discharge Activity: Activity As Tolerated, No Lifting Over 10 Pounds


Report the Following to Your Physician Immediately: Shortness of Breath, Fever 

over 101 Degrees, Unusual Bleeding - Needs a follow-up with me in 7 to 10 days 

after discharge

## 2020-08-13 NOTE — OPERATIVE REPORT
Nonrecallable Operative Report


DATE OF SURGERY: 08/13/20


PREOPERATIVE DIAGNOSIS: choecystitis


POSTOPERATIVE DIAGNOSIS: Cholecystitis


OPERATION: Laparoscopic cholecystectomy with intraoperative cholangiogram


SURGEON: LAKEISHA OROZCO ASSISTANT: CORINA ALVAREZ


ANESTHESIA: GA


TISSUE REMOVED OR ALTERED: Gallbladder


COMPLICATIONS: 








None


ESTIMATED BLOOD LOSS: 10 cc


INTRAOPERATIVE FINDINGS: Mildly dilated bile duct with no distal obstructing 

stone


PROCEDURE: 





After obtaining informed consent, the patient was taken to the operating room.  

General  Anesthesia was induced; the arms were extended, and the abdomen was 

exposed, and prepped and draped in a sterile fashion.  Instrumentation was set 

up for laparoscopic cholecystectomy.


 


Surgical plan and surgical timeout were conducted.


 


A vertical incision was made above the umbilicus, and a verres needle was inser

suzanne uneventfully into the peritoneal cavity.  Pneumoperitoneum was established.


 


The verres needle was removed and a 10 5 mm trocar was inserted and a 10 mm 

laparoscope was inserted.  Visualization of the peritoneal cavity confirmed safe

uneventful entry.  Under direct visualization 3 additional 5 mm ports were 

established, one in the subxiphoid position and second in the subcostal 

position.  Visualization of the hepatobiliary anatomy revealed no anatomic 

variations.  A grasper was placed on the fundus of the gallbladder and the 

gallbladder is elevated over the right surface of the liver; a second grasper 

was used to grasp the infundibulum of the gallbladder.  The neck of the 

gallbladder and junction with the cystic duct was dissected out.  The Cystic 

artery was in its usual location medial and cephalad to the cystic duct.  The 

cystic artery was surrounded with a right angle clamp, clipped twice proximally 

and divided with laparoscopic scissors.


 


We now opened the triangle of Calot by dividing the peritoneal reflection on 

both the medial and lateral sides of the cystic duct infundibular junction.  The

critical view was obtained.  We now milked the cystic duct of any possible ston

es, clipped the cystic duct approximately 2 times, we then made a small cystic 

ductotomy with the EndoShears and placed a cholangiogram catheter into the 

cystic duct intraoperative cholangiogram was obtained.  There was good flow into

the duodenum with tapering of the distal bile duct however the common bile duct 

intrahepatic ducts were mildly dilated.





I reviewed the CT scan there was no evidence of pancreatic ductal dilatation on 

the CT scan and there was no evidence of a pancreatic mass.





The cholangiogram catheter was then removed and 2 endoclips placed on the stay 

side of the cystic duct.  A cystic duct was then divided.


 


The gallbladder was now removed from the undersurface of the liver using hook 

cautery dissection.  Graspers were repositioned and the gallbladder was removed 

uneventfully from the abdominal cavity through the super umbilical port site 

incision.  The specimen was examined, then passed off to pathology for permanent

analysis.


 


We returned to the peritoneal cavity check for bleeding, and evidence of bile 

leak, and there was none.  We  Confirmed satisfactory placement of clips on 

cystic duct and cystic artery were secured .  At this point we felt  the 

operation was complete.  The subcutaneous tissue was then anesthetized  with 

quarter percent Marcaine Sponge and needle counts are correct.  All ports 

removed under direct visualization pneumoperitoneum evacuated, and 5 mm port 

wounds closed with 3-0 Vicryl suture, benzoin and Steri-Strips.


 


The patient was extubated, and taken to the recovery room in stable condition.





Sponge needle counts were correct x2.





NADIYA Cortez was present for the entire operation for help with wound 

retraction wound closure